# Patient Record
Sex: FEMALE | Race: WHITE | Employment: UNEMPLOYED | URBAN - METROPOLITAN AREA
[De-identification: names, ages, dates, MRNs, and addresses within clinical notes are randomized per-mention and may not be internally consistent; named-entity substitution may affect disease eponyms.]

---

## 2017-06-04 ENCOUNTER — HOSPITAL ENCOUNTER (EMERGENCY)
Facility: HOSPITAL | Age: 47
Discharge: HOME | End: 2017-06-04
Attending: EMERGENCY MEDICINE
Payer: COMMERCIAL

## 2017-06-04 ENCOUNTER — APPOINTMENT (OUTPATIENT)
Dept: CT IMAGING | Facility: HOSPITAL | Age: 47
End: 2017-06-04
Attending: EMERGENCY MEDICINE
Payer: COMMERCIAL

## 2017-06-04 VITALS
OXYGEN SATURATION: 99 % | RESPIRATION RATE: 16 BRPM | BODY MASS INDEX: 22.5 KG/M2 | HEART RATE: 87 BPM | HEIGHT: 66 IN | TEMPERATURE: 99 F | WEIGHT: 140 LBS | DIASTOLIC BLOOD PRESSURE: 67 MMHG | SYSTOLIC BLOOD PRESSURE: 114 MMHG

## 2017-06-04 DIAGNOSIS — N92.0 MENORRHAGIA WITH REGULAR CYCLE: ICD-10-CM

## 2017-06-04 DIAGNOSIS — D64.9 ANEMIA, UNSPECIFIED TYPE: Primary | ICD-10-CM

## 2017-06-04 DIAGNOSIS — R10.9 ABDOMINAL CRAMPING: ICD-10-CM

## 2017-06-04 PROCEDURE — 85007 BL SMEAR W/DIFF WBC COUNT: CPT | Performed by: EMERGENCY MEDICINE

## 2017-06-04 PROCEDURE — 85060 BLOOD SMEAR INTERPRETATION: CPT | Performed by: EMERGENCY MEDICINE

## 2017-06-04 PROCEDURE — 81025 URINE PREGNANCY TEST: CPT

## 2017-06-04 PROCEDURE — 81001 URINALYSIS AUTO W/SCOPE: CPT

## 2017-06-04 PROCEDURE — 80048 BASIC METABOLIC PNL TOTAL CA: CPT

## 2017-06-04 PROCEDURE — 83690 ASSAY OF LIPASE: CPT

## 2017-06-04 PROCEDURE — 85025 COMPLETE CBC W/AUTO DIFF WBC: CPT

## 2017-06-04 PROCEDURE — 80076 HEPATIC FUNCTION PANEL: CPT

## 2017-06-04 PROCEDURE — 96360 HYDRATION IV INFUSION INIT: CPT

## 2017-06-04 PROCEDURE — 86900 BLOOD TYPING SEROLOGIC ABO: CPT | Performed by: EMERGENCY MEDICINE

## 2017-06-04 PROCEDURE — 86901 BLOOD TYPING SEROLOGIC RH(D): CPT | Performed by: EMERGENCY MEDICINE

## 2017-06-04 PROCEDURE — 99284 EMERGENCY DEPT VISIT MOD MDM: CPT

## 2017-06-04 PROCEDURE — 86850 RBC ANTIBODY SCREEN: CPT | Performed by: EMERGENCY MEDICINE

## 2017-06-04 PROCEDURE — 82272 OCCULT BLD FECES 1-3 TESTS: CPT

## 2017-06-04 PROCEDURE — 74177 CT ABD & PELVIS W/CONTRAST: CPT | Performed by: EMERGENCY MEDICINE

## 2017-06-05 NOTE — ED PROVIDER NOTES
Patient Seen in: [facility] Emergency Department    History   Patient presents with:  Abdominal Pain    Stated Complaint: Severe abdominal pain    HPI    55year old female with h/o uterine fibroid and heavy menses who presents with acute onset ab supple. Cardiovascular: Normal rate, regular rhythm, normal heart sounds and intact distal pulses. No murmur heard. Pulmonary/Chest: Effort normal. No respiratory distress. Abdominal: Soft. She exhibits no distension.  There is tenderness in the lef Abnormal            Final result                 Please view results for these tests on the individual orders. MD BLOOD SMEAR CONSULT   TYPE AND SCREEN    Narrative: The following orders were created for panel order TYPE AND SCREEN.   Procedure see if it is her baseline or she shows signs of blood loss. Pt denies and states that she knows it is always low after her menstrual cycle, although she does not know numerically how low.  Pt reports just finishing a heavy menstrual cycle and feels at her b

## (undated) NOTE — ED AVS SNAPSHOT
[facility] Emergency Department    [address]     [place] [address]    Phone:  [phone]    Fax:  [phone]           Dru Acosta   MRN: [id number]    Department:  [facility] Emergency Department   Date of Visit:  6/4/ Insurance plans vary and the physician(s) referred by the ER may not be covered by your plan. Please contact your insurance company to determine coverage and benefits available for follow-up care and referrals.       If you have difficulty scheduling your prescription right away and begin taking the medication(s) as directed.   If you believe that any of the medications or instructions on this list is different from what your Primary Care doctor has instructed you - please continue to take your medications a Patient [address] to help you get signed up for insurance coverage. [employer] [address] is a Federal Navigator program that can help with your Affordable Care Act coverage, as well as all types of Medicaid plans.   To get signed up and covere

## (undated) NOTE — ED AVS SNAPSHOT
[facility] Emergency Department    [address] [place] [address]    Phone:  [phone]    Fax:  [phone]           Ruben Carrera   MRN: [id number]    Department:  [facility] Emergency Department   Date of Visit:  6/4/ and Class Registration line at [phone] or find a doctor online by visiting [email or web address].    IF THERE IS ANY CHANGE OR WORSENING OF YOUR CONDITION, CALL YOUR PRIMARY CARE PHYSICIAN AT ONCE OR RETURN IMMEDIATELY TO [address] [place] [address].     If